# Patient Record
Sex: MALE | Race: WHITE
[De-identification: names, ages, dates, MRNs, and addresses within clinical notes are randomized per-mention and may not be internally consistent; named-entity substitution may affect disease eponyms.]

---

## 2019-02-04 ENCOUNTER — HOSPITAL ENCOUNTER (OUTPATIENT)
Dept: HOSPITAL 53 - M OPP | Age: 54
Discharge: HOME | End: 2019-02-04
Attending: INTERNAL MEDICINE
Payer: COMMERCIAL

## 2019-02-04 VITALS — HEIGHT: 72 IN | BODY MASS INDEX: 23.98 KG/M2 | WEIGHT: 177 LBS

## 2019-02-04 VITALS — DIASTOLIC BLOOD PRESSURE: 82 MMHG | SYSTOLIC BLOOD PRESSURE: 131 MMHG

## 2019-02-04 DIAGNOSIS — K64.0: ICD-10-CM

## 2019-02-04 DIAGNOSIS — K63.5: ICD-10-CM

## 2019-02-04 DIAGNOSIS — Z12.11: Primary | ICD-10-CM

## 2019-02-04 DIAGNOSIS — D12.5: ICD-10-CM

## 2019-02-04 DIAGNOSIS — F17.210: ICD-10-CM

## 2019-02-04 NOTE — ROOR
________________________________________________________________________________

Patient Name: Igor Michel            Procedure Date: 2/4/2019 8:42 AM

MRN: [id number]                          Account Number: [id number]

YOB: 1965               Age: 54

Room: [facility]                            Gender: Male

Note Status: Finalized                 

________________________________________________________________________________

 

Procedure:           Total Colonoscopy to Cecum + Biopsy Polypectomy

Indications:         Screening for colorectal malignant neoplasm

Providers:           Joe Elizabeth MD

Referring MD:        Galina Rizzo Np

Requesting Provider: 

Medicines:           Monitored Anesthesia Care

Complications:       No immediate complications.

________________________________________________________________________________

Procedure:           Pre-Anesthesia Assessment:

                     - The heart rate, respiratory rate, oxygen saturations, 

                     blood pressure, adequacy of pulmonary ventilation, and 

                     response to care were monitored throughout the procedure.

                     The Colonoscope was introduced through the anus and 

                     advanced to the cecum, identified by appendiceal orifice 

                     and ileocecal valve. The colonoscopy was performed 

                     without difficulty. The patient tolerated the procedure 

                     well. The quality of the bowel preparation was excellent.

                                                                                

Findings:

     The perianal and digital rectal examinations were normal.

     Non-bleeding internal hemorrhoids were found during retroflexion. The 

     hemorrhoids were small and Grade I (internal hemorrhoids that do not 

     prolapse).

     A small polyp was found at 60 cm proximal to the anus. The polyp was 

     sessile. The polyp was removed with a jumbo cold forceps. Resection and 

     retrieval were complete.

     No other significant abnormalities were identified in a careful 

     examination of the remainder of the colon.

     A small polyp was found in the sigmoid colon. The polyp was sessile. The 

     polyp was removed with a jumbo cold forceps. Resection and retrieval were 

     complete.

     The exam was otherwise without abnormality on direct and retroflexion 

     views.

                                                                                

Impression:          - Non-bleeding internal hemorrhoids.

                     - One small polyp at 60 cm proximal to the anus, removed 

                     with a jumbo cold forceps. Resected and retrieved.

                     - One small polyp in the sigmoid colon, removed with a 

                     jumbo cold forceps. Resected and retrieved.

                     - The examination was otherwise normal on direct and 

                     retroflexion views.

                     - The exam was otherwise normal to the cecum.

Recommendation:      - Patient has a contact number available for emergencies. 

                     The signs and symptoms of potential delayed complications 

                     were discussed with the patient. Return to normal 

                     activities tomorrow. Written discharge instructions were 

                     provided to the patient.

                     - High fiber diet.

                     - Discharge patient to home.

                     - Continue present medications.

                     - Await pathology results.

                     - Telephone GI clinic for pathology results in 1 week.

                     - Repeat colonoscopy in 5 years for surveillance based on 

                     pathology results.

                     - Return to referring physician.

                     - The findings and recommendations were discussed with 

                     the patient's family.

                                                                                

 

Joe Elizabeth MD

____________________

Joe Elizabeth MD

2/4/2019 9:06:54 AM

This report has been signed electronically.

Number of Addenda: 0

 

Note Initiated On: 2/4/2019 8:42 AM

Estimated Blood Loss:

     Estimated blood loss: none.

## 2020-03-17 ENCOUNTER — HOSPITAL ENCOUNTER (EMERGENCY)
Dept: HOSPITAL 53 - M ED | Age: 55
Discharge: LEFT BEFORE DISCHARGE | End: 2020-03-17
Payer: COMMERCIAL

## 2020-03-17 VITALS — DIASTOLIC BLOOD PRESSURE: 97 MMHG | SYSTOLIC BLOOD PRESSURE: 148 MMHG

## 2020-03-17 VITALS — HEIGHT: 71 IN | BODY MASS INDEX: 24.26 KG/M2 | WEIGHT: 173.28 LBS

## 2020-03-17 DIAGNOSIS — Z88.8: ICD-10-CM

## 2020-03-17 DIAGNOSIS — Z88.2: ICD-10-CM

## 2020-03-17 DIAGNOSIS — R53.1: Primary | ICD-10-CM

## 2020-03-17 DIAGNOSIS — Z88.1: ICD-10-CM

## 2020-03-17 DIAGNOSIS — Z87.891: ICD-10-CM

## 2020-03-17 LAB
ALBUMIN SERPL BCG-MCNC: 4.3 GM/DL (ref 3.2–5.2)
ALT SERPL W P-5'-P-CCNC: 28 U/L (ref 12–78)
APTT BLD: 30.6 SECONDS (ref 25–38.4)
BASOPHILS # BLD AUTO: 0.1 10^3/UL (ref 0–0.2)
BASOPHILS NFR BLD AUTO: 0.9 % (ref 0–1)
BILIRUB CONJ SERPL-MCNC: 0.1 MG/DL (ref 0–0.2)
BILIRUB SERPL-MCNC: 0.5 MG/DL (ref 0.2–1)
BUN SERPL-MCNC: 21 MG/DL (ref 7–18)
CALCIUM SERPL-MCNC: 9.3 MG/DL (ref 8.5–10.1)
CHLORIDE SERPL-SCNC: 105 MEQ/L (ref 98–107)
CK MB CFR.DF SERPL CALC: 0.68
CK MB SERPL-MCNC: < 1 NG/ML (ref ?–3.6)
CK SERPL-CCNC: 147 U/L (ref 39–308)
CO2 SERPL-SCNC: 26 MEQ/L (ref 21–32)
CREAT SERPL-MCNC: 1.26 MG/DL (ref 0.7–1.3)
EOSINOPHIL # BLD AUTO: 0.1 10^3/UL (ref 0–0.5)
EOSINOPHIL NFR BLD AUTO: 1.7 % (ref 0–3)
GFR SERPL CREATININE-BSD FRML MDRD: > 60 ML/MIN/{1.73_M2} (ref 56–?)
GLUCOSE SERPL-MCNC: 85 MG/DL (ref 70–100)
HCT VFR BLD AUTO: 44.3 % (ref 42–52)
HGB BLD-MCNC: 15.4 G/DL (ref 13.5–17.5)
INR PPP: 1.05
LYMPHOCYTES # BLD AUTO: 2.6 10^3/UL (ref 1.5–5)
LYMPHOCYTES NFR BLD AUTO: 37.4 % (ref 24–44)
MCH RBC QN AUTO: 32.2 PG (ref 27–33)
MCHC RBC AUTO-ENTMCNC: 34.8 G/DL (ref 32–36.5)
MCV RBC AUTO: 92.7 FL (ref 80–96)
MONOCYTES # BLD AUTO: 0.7 10^3/UL (ref 0–0.8)
MONOCYTES NFR BLD AUTO: 9.9 % (ref 0–5)
NEUTROPHILS # BLD AUTO: 3.5 10^3/UL (ref 1.5–8.5)
NEUTROPHILS NFR BLD AUTO: 49.8 % (ref 36–66)
PLATELET # BLD AUTO: 307 10^3/UL (ref 150–450)
POTASSIUM SERPL-SCNC: 3.8 MEQ/L (ref 3.5–5.1)
PROT SERPL-MCNC: 7.5 GM/DL (ref 6.4–8.2)
PROTHROMBIN TIME: 13.4 SECONDS (ref 11.8–14)
RBC # BLD AUTO: 4.78 10^6/UL (ref 4.3–6.1)
SODIUM SERPL-SCNC: 137 MEQ/L (ref 136–145)
T4 FREE SERPL-MCNC: 1.01 NG/DL (ref 0.76–1.46)
TROPONIN I SERPL-MCNC: < 0.02 NG/ML (ref ?–0.1)
TSH SERPL DL<=0.005 MIU/L-ACNC: 9.15 UIU/ML (ref 0.36–3.74)
WBC # BLD AUTO: 7 10^3/UL (ref 4–10)

## 2020-03-17 NOTE — ECGEPIP
[facility] - ED

                                       

                                       Test Date:    2020

Pat Name:     VLADIMIR LIM            Department:   

Patient ID:   [id number]                 Room:         -

Gender:       Male                     Technician:   

:          1965               Requested By: LORENA QUEZADA

Order Number: [id number]     Reading MD:   Janina Mendez

                                 Measurements

Intervals                              Axis          

Rate:         94                       P:            46

NM:           129                      QRS:          55

QRSD:         95                       T:            35

QT:           349                                    

QTc:          437                                    

                           Interpretive Statements

SINUS RHYTHM

NO PRIOR

Electronically Signed on 3- 20:12:12 EDT by Janina Mendez